# Patient Record
Sex: MALE | Race: WHITE | NOT HISPANIC OR LATINO | Employment: OTHER | URBAN - NONMETROPOLITAN AREA
[De-identification: names, ages, dates, MRNs, and addresses within clinical notes are randomized per-mention and may not be internally consistent; named-entity substitution may affect disease eponyms.]

---

## 2023-08-04 NOTE — PROGRESS NOTES
"Chief Complaint  Elevated PSA    Subjective          Eli Martinez presents to [facility]   Elevated PSA  Patient presents today with a presumed abnormality of the prostate gland, that is an elevated PSA.  This is been found in the context of prostate cancer screening.  The severity is best defined as a PSA 5.6 ng/mL with an 11%f/t ratio.  This test was done approximately 3 weeks ago.   On BENJAMIN there was a \"ridge on left lateral prostate\" per PCP.   Patient denies any suspected systemic symptoms of prostate cancer such as weight loss, lower extremity edema, or skeletal pain that could be worrisome for metastases.  Previous biopsy history, if applicable:          Current Outpatient Medications:     Apple Cider Vinegar-Merline 500-5 MG chewable tablet, Chew., Disp: , Rfl:     aspirin 81 MG EC tablet, Take 1 tablet by mouth Daily., Disp: , Rfl:     meloxicam (MOBIC) 15 MG tablet, Take 1 tablet by mouth Daily., Disp: 30 tablet, Rfl: 11    olmesartan (BENICAR) 40 MG tablet, Take 1 tablet by mouth Daily., Disp: 30 tablet, Rfl: 11    POTASSIUM PO, Take  by mouth., Disp: , Rfl:     tamsulosin (FLOMAX) 0.4 MG capsule 24 hr capsule, Take 1 capsule by mouth Daily., Disp: 30 capsule, Rfl: 11  History reviewed. No pertinent past medical history.  Past Surgical History:   Procedure Laterality Date    GALLBLADDER SURGERY      VASECTOMY             Review of Systems      Objective   PHYSICAL EXAM  Vital Signs:   Temp 96.7 øF (35.9 øC)   Ht 172.7 cm (68\")   Wt 89.7 kg (197 lb 12.8 oz)   BMI 30.08 kg/mý     Physical Exam  The prostate is approximately 35 ml. It is somewhat asymmetric.  The left side I think is larger and softer compared to the right side which is a bit firm.  There are no nodules present.  and The gland is no more painful than expected.. The seminal vesicles are Palpably normal in size and without mass.      DATA  Result Review :              Results for orders placed or performed in " visit on 08/15/23   POC Urinalysis Dipstick, Multipro    Specimen: Urine   Result Value Ref Range    Color Yellow Yellow, Straw, Dark Yellow, Margarita    Clarity, UA Clear Clear    Glucose, UA Negative Negative mg/dL    Bilirubin Negative Negative    Ketones, UA Negative Negative    Specific Gravity  1.010 1.005 - 1.030    Blood, UA Trace (A) Negative    pH, Urine 7.0 5.0 - 8.0    Protein, POC Negative Negative mg/dL    Urobilinogen, UA 0.2 E.U./dL Normal, 0.2 E.U./dL    Nitrite, UA Negative Negative    Leukocytes Negative Negative                  ASSESSMENT AND PLAN          Problem List Items Addressed This Visit    None  Visit Diagnoses       Elevated prostate specific antigen (PSA)    -  Primary    Relevant Orders    POC Urinalysis Dipstick, Multipro (Completed)    MRI Pelvis With & Without Contrast        This represents an undiagnosed  problem with uncertain prognosis.      I discussed elevated PSA with the patient today.  We discussed that PSA is a protein measured in the bloodstream that comes exclusively from the prostate gland.  I mentioned to him that all men with a prostate gland will have a certain PSA level.  We discussed this number can be compared to all men, or men of a certain age.  We can also follow trend of PSA which is called the PSA velocity.  We discussed the prostate cancer is a possible cause of PSA elevation, but benign etiologies such as infection, enlargement, aging, and inflammation should also be considered.  There is also convincing evidence that some patients will have a PSA that waxes and wanes completely unrelated to symptomatic disease of the prostate for cancer.  We discussed that some patients with a normal PSA may also have prostate cancer.  The necessity of digital rectal exam is also discussed.  We discussed the role of free to total PSA ratio.  It is explained that this test is done in hopes of avoiding unnecessary biopsies, but that a few patients with prostate cancer will  "have false-negative results when measuring there free PSA.  We also discussed that PSA, in many patients, varies considerably for unexplained reasons.  Sometimes repeating the PSA in a few months gives time for a \"correction \" to baseline.    We did discuss the natural course of prostate cancer in most patients.  It is explained that waiting to see what the results of this test*are very unlikely to affect the clinical course of the disease.  However, there are exceptions in the biology of each cancer.  The risks and possible benefits of transrectal ultrasound with biopsy of the prostate gland is also discussed.  I did explain that biopsy is the standard of care for diagnosing prostate cancer. The risks, alternatives, and benefits of this treatment recommendation are discussed.      Lastly we discussed use of multi parametric MRI in the \"pre-biopsy \"setting. The available evidence suggests that incorporation of prebiopsy MRI into the diagnostic pathway for a clinically suspected prostate cancer improves the diagnosis of clinically significant disease, reduces adverse effects from biopsy, and can potentially prevent unnecessary biopsies in some individuals. The PROMIS study, has shown that standard biopsy may miss up to half of clinically significant disease compared with 5 mm template mapping biopsies.  Furthermore 3 studies (PRECISION,4M&MRI-FIRST) have shown that MRI targeted biopsy is detect more clinically significant disease and reduce over detection of indolent disease whilst allowing between 1/3-1/2 of men to avoid immediate biopsy.  However, I did explain that studies have also shown that one fourth of the [place] grade 3+4 lesions (which may or may not be clinically significant) are not detected by the MRI. However, there is no consensus on the appropriate selection of males for MRI prior to first TRUS prostate biopsy, and this is an area in evolution. Not surprisingly, guidelines from expert groups are " variable:    ?Updated guidelines for prostate cancer diagnosis and management from the [place] [employer] [place] [employer] (NICE) suggest offering multiparametric MRI as the first-line investigation for all people with suspected clinically localized prostate cancer.    ?National Comprehensive Cancer Network (NCCN) guidelines recommend consideration of MRI, but they do not provide any guidelines for selecting appropriate candidates [46].    ?American Urological Association (AUA) guidelines state that there are insufficient data to recommend routine MRI in every biopsy-na‹ve patient under consideration for prostate biopsy. Its use may be considered in males for whom the clinical indications for biopsy are uncertain (minimal PSA increase, abnormal digital rectal examination [BENJAMIN] with normal PSA, or very young or old patients). He is in the relatively young group.     ?Updated year 2019 guidelines from the  Association of Urology (EAU) endorse MRI prior to initial biopsy.    ?Guidelines from [facility] [place] ([facility]O) do not advocate MRI prior to initial TRUS-guided biopsy.     The bottom line is that Prostate MRI with MRI-directed biopsy is increasingly regarded as a valuable tool for refining risk status for clinically meaningful prostate cancer. Multiparametric MRI-directed biopsy is more sensitive than systematic TRUS-guided techniques (particularly for anterior lesions) when used alone for detecting clinically significant prostate cancers and, importantly, for reducing the number of insignificant cancers diagnosed.  Patient has opted for a prebiopsy MRI based on our discussion.     Data Reviewed, discussed as option and/or ordered during today's visit to evaluate and manage:   DATA ORDERED/REVIEWED THIS VISIT: PSA, URINALYSIS, and MULTIPARAMETRIC MRI TO ASSESS POSSIBILITY OF PROSTATE CANCER ORITS STAGE IF PRESENT        FOLLOW UP     No follow-ups on file.        (Please note  that portions of this note were completed with a voice recognition program.)  Danish Prasad MD  08/16/23  09:34 CDT

## 2023-08-15 ENCOUNTER — PATIENT ROUNDING (BHMG ONLY) (OUTPATIENT)
Dept: UROLOGY | Facility: CLINIC | Age: 58
End: 2023-08-15
Payer: COMMERCIAL

## 2023-08-15 ENCOUNTER — OFFICE VISIT (OUTPATIENT)
Dept: UROLOGY | Facility: CLINIC | Age: 58
End: 2023-08-15
Payer: COMMERCIAL

## 2023-08-15 VITALS — WEIGHT: 197.8 LBS | HEIGHT: 68 IN | BODY MASS INDEX: 29.98 KG/M2 | TEMPERATURE: 96.7 F

## 2023-08-15 DIAGNOSIS — R97.20 ELEVATED PROSTATE SPECIFIC ANTIGEN (PSA): Primary | ICD-10-CM

## 2023-08-15 LAB
BILIRUB BLD-MCNC: NEGATIVE MG/DL
CLARITY, POC: CLEAR
COLOR UR: YELLOW
GLUCOSE UR STRIP-MCNC: NEGATIVE MG/DL
KETONES UR QL: NEGATIVE
LEUKOCYTE EST, POC: NEGATIVE
NITRITE UR-MCNC: NEGATIVE MG/ML
PH UR: 7 [PH] (ref 5–8)
PROT UR STRIP-MCNC: NEGATIVE MG/DL
RBC # UR STRIP: ABNORMAL /UL
SP GR UR: 1.01 (ref 1–1.03)
UROBILINOGEN UR QL: ABNORMAL

## 2023-08-15 PROCEDURE — 81003 URINALYSIS AUTO W/O SCOPE: CPT | Performed by: UROLOGY

## 2023-08-15 PROCEDURE — 99204 OFFICE O/P NEW MOD 45 MIN: CPT | Performed by: UROLOGY

## 2023-08-15 RX ORDER — OLMESARTAN MEDOXOMIL 40 MG/1
40 TABLET ORAL DAILY
Qty: 30 TABLET | Refills: 11 | COMMUNITY
Start: 2023-06-14 | End: 2024-06-14

## 2023-08-15 RX ORDER — ASPIRIN 81 MG/1
81 TABLET ORAL DAILY
COMMUNITY

## 2023-08-15 RX ORDER — MELOXICAM 15 MG/1
15 TABLET ORAL DAILY
Qty: 30 TABLET | Refills: 11 | COMMUNITY
Start: 2023-06-14 | End: 2024-06-14

## 2023-08-15 RX ORDER — TAMSULOSIN HYDROCHLORIDE 0.4 MG/1
0.4 CAPSULE ORAL DAILY
Qty: 30 CAPSULE | Refills: 11 | COMMUNITY
Start: 2023-07-28 | End: 2024-07-28

## 2023-08-15 NOTE — PROGRESS NOTES
August 15, 2023    Hello, may I speak with Eli Martinez?    My name is Yanely      I am  with [facility]  [address] [place] [address], [address]  [facility] [id number] [phone].    Before we get started may I verify your date of birth? 1965    I am calling to officially welcome you to our practice and ask about your recent visit. Is this a good time to talk? yes    Tell me about your visit with us. What things went well?  It went as well as could be expected.  The nurse was very nice.  The provider was professional and personable.       We're always looking for ways to make our patients' experiences even better. Do you have recommendations on ways we may improve?  no    Overall were you satisfied with your first visit to our practice? yes       I appreciate you taking the time to speak with me today. Is there anything else I can do for you? no      Thank you, and have a great day.

## 2023-09-22 ENCOUNTER — HOSPITAL ENCOUNTER (OUTPATIENT)
Dept: MRI IMAGING | Facility: HOSPITAL | Age: 58
Discharge: HOME | End: 2023-09-22
Admitting: UROLOGY
Payer: COMMERCIAL

## 2023-09-22 DIAGNOSIS — R97.20 ELEVATED PROSTATE SPECIFIC ANTIGEN (PSA): ICD-10-CM

## 2023-09-22 LAB — CREAT BLDA-MCNC: 1 MG/DL (ref 0.6–1.3)

## 2023-09-22 PROCEDURE — A9577 INJ MULTIHANCE: HCPCS | Performed by: UROLOGY

## 2023-09-22 PROCEDURE — 72197 MRI PELVIS W/O & W/DYE: CPT

## 2023-09-22 PROCEDURE — 0 GADOBENATE DIMEGLUMINE 529 MG/ML SOLUTION: Performed by: UROLOGY

## 2023-09-22 PROCEDURE — 82565 ASSAY OF CREATININE: CPT

## 2023-09-22 RX ADMIN — GADOBENATE DIMEGLUMINE 17 ML: 529 INJECTION, SOLUTION INTRAVENOUS at 14:34

## 2023-09-29 ENCOUNTER — TELEPHONE (OUTPATIENT)
Dept: UROLOGY | Facility: CLINIC | Age: 58
End: 2023-09-29
Payer: COMMERCIAL

## 2023-09-29 NOTE — TELEPHONE ENCOUNTER
I was able to touch base with him about his multiparametric MRI.  No lesions were identified.  Specifically along the left lateral edge of the prostate I really see nothing to make us concerned.  I did tell him that some studies have shown up to 20% of significant prostate cancers can be missed on MRI although we are probably seeing some improvement in that just based on experience radiologist reading the studies.  At this point he opts to repeat the PSA and 6 months which I think is very reasonable.  This will be done at his PCP because he will see Dr. Cruz in 6 months.  At that point we could even consider doing a select MDX on him.  He is very comfortable with this plan after our discussion.Electronically signed by Danish Prasad MD, 09/29/23, 3:08 PM CDT.